# Patient Record
Sex: MALE | Race: WHITE | NOT HISPANIC OR LATINO | RURAL
[De-identification: names, ages, dates, MRNs, and addresses within clinical notes are randomized per-mention and may not be internally consistent; named-entity substitution may affect disease eponyms.]

---

## 2023-07-08 ENCOUNTER — HOSPITAL ENCOUNTER (EMERGENCY)
Facility: HOSPITAL | Age: 53
Discharge: HOME | End: 2023-07-08
Attending: EMERGENCY MEDICINE
Payer: OTHER GOVERNMENT

## 2023-07-08 VITALS
OXYGEN SATURATION: 98 % | HEART RATE: 61 BPM | RESPIRATION RATE: 16 BRPM | DIASTOLIC BLOOD PRESSURE: 85 MMHG | SYSTOLIC BLOOD PRESSURE: 124 MMHG | TEMPERATURE: 98 F

## 2023-07-08 DIAGNOSIS — T18.9XXA INGESTION OF FOREIGN BODY: Primary | ICD-10-CM

## 2023-07-08 PROCEDURE — 99284 EMERGENCY DEPT VISIT MOD MDM: CPT | Mod: 25

## 2023-07-08 PROCEDURE — 99283 EMERGENCY DEPT VISIT LOW MDM: CPT | Mod: ,,, | Performed by: EMERGENCY MEDICINE

## 2023-07-08 PROCEDURE — 99283 PR EMERGENCY DEPT VISIT,LEVEL III: ICD-10-PCS | Mod: ,,, | Performed by: EMERGENCY MEDICINE

## 2023-07-08 RX ORDER — LEVETIRACETAM 500 MG/1
500 TABLET ORAL 2 TIMES DAILY
COMMUNITY

## 2023-07-08 RX ORDER — CHLORPROMAZINE HYDROCHLORIDE 25 MG/1
25 TABLET, FILM COATED ORAL 3 TIMES DAILY
COMMUNITY

## 2023-07-08 RX ORDER — AMLODIPINE BESYLATE 5 MG/1
5 TABLET ORAL DAILY
COMMUNITY

## 2023-07-08 RX ORDER — LISINOPRIL 10 MG/1
10 TABLET ORAL DAILY
COMMUNITY

## 2023-07-08 RX ORDER — SERTRALINE HYDROCHLORIDE 50 MG/1
50 TABLET, FILM COATED ORAL DAILY
COMMUNITY

## 2023-07-08 NOTE — ED PROVIDER NOTES
Encounter Date: 7/8/2023       History     Chief Complaint   Patient presents with    GI Problem     From [facility]  - they state he swollwed a razor blade - again - hx of same  - no distress noted     51 Y/O MALE SAYS HE HAS BEEN HEARING VOICES AND SUBSEQUENTLY SWALLOWED A SMALL PIECE OF A RAZOR BLADE.  HE SAYS HE HAS DONE THIS BEFORE ON SEVERAL OCCASIONS AFTER WRAPPING THE BLADE IN PLASTIC.  HE ALSO HAS BEEN CUTTING THE VOLAR ASPECT OF HIS LEFT FOREARM.  HE HAS NUMEROUS VERY SUPERFICIAL LINEAR ABRASIONS TO HIS LEFT FOREARM.      Review of patient's allergies indicates:   Allergen Reactions    Clozapine     Haldol [haloperidol lactate]      Past Medical History:   Diagnosis Date    Antisocial personality disorder     Hypertension     PTSD (post-traumatic stress disorder)     Seizures      History reviewed. No pertinent surgical history.  History reviewed. No pertinent family history.  Social History     Tobacco Use    Smoking status: Every Day     Types: Cigarettes    Smokeless tobacco: Former   Substance Use Topics    Alcohol use: Not Currently    Drug use: Yes     Types: Marijuana     Review of Systems   All other systems reviewed and are negative.    Physical Exam     Initial Vitals [07/08/23 0415]   BP Pulse Resp Temp SpO2   128/86 63 16 97.6 °F (36.4 °C) 98 %      MAP       --         Physical Exam    Nursing note and vitals reviewed.  Constitutional: He appears well-developed and well-nourished.   HENT:   Head: Normocephalic and atraumatic.   Nose: Nose normal.   Mouth/Throat: Oropharynx is clear and moist.   Eyes: Conjunctivae and EOM are normal. Pupils are equal, round, and reactive to light.   Neck: Neck supple.   Normal range of motion.  Cardiovascular:  Normal rate, regular rhythm, normal heart sounds and intact distal pulses.           Pulmonary/Chest: Breath sounds normal.   Abdominal: Abdomen is soft. Bowel sounds are normal.   Musculoskeletal:         General: Normal range of motion.      Cervical back:  Normal range of motion and neck supple.     Neurological: He is alert and oriented to person, place, and time. He has normal strength. GCS score is 15. GCS eye subscore is 4. GCS verbal subscore is 5. GCS motor subscore is 6.   Skin: Skin is warm and dry. Capillary refill takes less than 2 seconds.   NUMEROUS SUPERFICIAL LINEAR ABRASIONS / LACERATIONS TO VOLAR ASPECT OF LEFT FOREARM.     Psychiatric: He has a normal mood and affect.       Medical Screening Exam   See Full Note    ED Course   Procedures  Labs Reviewed - No data to display       Imaging Results    None          Medications - No data to display  Medical Decision Making:   Initial Assessment:   INGESTED FOREIGN BODY, PIECE OF DISPOSABLE RAZOR.    Differential Diagnosis:   DDX:  INGESTED FB VS NOT VS OTHER  Clinical Tests:   Radiological Study: Ordered and Reviewed  ED Management:  DX:  INGESTED FOREIGN BODY.  LINEAR DENSITY IN LLQ IN KUB IS POSSIBLE FOREIGN BODY, BUT UNCERTAIN.  FOLLOW UP AS NEEDED.                         Clinical Impression:   Final diagnoses:  [T18.9XXA] Ingestion of foreign body (Primary)        ED Disposition Condition    Discharge Stable          ED Prescriptions    None       Follow-up Information       Follow up With Specialties Details Why Contact Info    PRIMARY CARE PROVIDER   As needed              James Plascencia MD  07/08/23 3715       James Plascencia MD  07/08/23 4360

## 2023-07-10 ENCOUNTER — TELEPHONE (OUTPATIENT)
Dept: EMERGENCY MEDICINE | Facility: HOSPITAL | Age: 53
End: 2023-07-10
Payer: OTHER GOVERNMENT

## 2023-07-10 NOTE — TELEPHONE ENCOUNTER
----- Message from Jolene López RN sent at 7/9/2023  4:17 PM CDT -----  Attempted to contact medical staff @ [facility] - continued to ring and went to voicemail.  ----- Message -----  From: CARLOS EDUARDO Cunha  Sent: 7/8/2023  12:13 PM CDT  To: [facility] Call Back Pool    Constipation on final read; no radiopaque foreign body. Encourage patient to take laxative for constipation and repeat KUB to ensure no foreign body retained.